# Patient Record
Sex: FEMALE | Race: WHITE | NOT HISPANIC OR LATINO
[De-identification: names, ages, dates, MRNs, and addresses within clinical notes are randomized per-mention and may not be internally consistent; named-entity substitution may affect disease eponyms.]

---

## 2022-10-20 ENCOUNTER — APPOINTMENT (OUTPATIENT)
Dept: ORTHOPEDIC SURGERY | Facility: CLINIC | Age: 27
End: 2022-10-20

## 2022-10-20 VITALS — BODY MASS INDEX: 25.52 KG/M2 | WEIGHT: 130 LBS | HEIGHT: 60 IN

## 2022-10-20 DIAGNOSIS — Z86.19 PERSONAL HISTORY OF OTHER INFECTIOUS AND PARASITIC DISEASES: ICD-10-CM

## 2022-10-20 DIAGNOSIS — Z78.9 OTHER SPECIFIED HEALTH STATUS: ICD-10-CM

## 2022-10-20 DIAGNOSIS — T14.8XXA OTHER INJURY OF UNSPECIFIED BODY REGION, INITIAL ENCOUNTER: ICD-10-CM

## 2022-10-20 DIAGNOSIS — A69.23 ARTHRITIS DUE TO LYME DISEASE: ICD-10-CM

## 2022-10-20 DIAGNOSIS — M22.2X2 PATELLOFEMORAL DISORDERS, LEFT KNEE: ICD-10-CM

## 2022-10-20 PROBLEM — Z00.00 ENCOUNTER FOR PREVENTIVE HEALTH EXAMINATION: Status: ACTIVE | Noted: 2022-10-20

## 2022-10-20 PROCEDURE — 99204 OFFICE O/P NEW MOD 45 MIN: CPT

## 2022-10-23 PROBLEM — M22.2X2 PATELLOFEMORAL PAIN SYNDROME OF LEFT KNEE: Status: ACTIVE | Noted: 2022-10-20

## 2022-10-23 PROBLEM — A69.23 LYME ARTHRITIS OF KNEE: Status: ACTIVE | Noted: 2022-10-20

## 2022-10-23 PROBLEM — T14.8XXA NERVE DAMAGE: Status: RESOLVED | Noted: 2022-10-20 | Resolved: 2022-10-23

## 2022-10-23 PROBLEM — Z86.19 HISTORY OF LYME DISEASE: Status: RESOLVED | Noted: 2022-10-20 | Resolved: 2022-10-23
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
## 2022-10-23 NOTE — IMAGING
[de-identified] : The patient is a well appearing 26 year  old female of their stated age. \par Patient ambulates with a normal gait. \par Negative straight leg raise bilateral \par \par Effected Knee: LEFT                         	 \par ROM:  0-145 degrees \par Lachman: Negative \par Pivot Shift: Negative \par Anterior Drawer: Negative \par Posterior Drawer / Sag:Negative \par Varus Stress 0 degrees: Stable \par Varus Stress 30 degrees: Stable \par Valgus Stress 0 degrees: Stable \par Valgus Stress 30 degrees: Stable \par Medial Raheem: Negative \par Lateral Raheem: Negative \par Patella Glide: 2+ \par Patella Apprehension: Negative \par Patella Grind: Negative \par \par Palpation: \par Medial Joint Line: TENDER\par Lateral Joint Line: TENDER\par Medial Collateral Ligament: Nontender \par Lateral Collateral Ligament/PLC: Nontender \par Distal Femur: Nontender \par Proximal Tibia: Nontender \par Tibial Tubercle: Nontender \par Distal Pole Patella: Nontender \par Quadriceps Tendon: Nontender &  Intact \par Patella Tendon: TENDER MEDIALLY &  Intact \par Medial Distal Hamstring/PES: Nontender \par Lateral Distal Hamstring: Nontender & Stable \par Iliotibial Band: Nontender \par Medial Patellofemoral Ligament: Nontender \par Adductor: Nontender \par Proximal GSC-Plantaris: Nontender \par Calf: Supple & Nontender \par \par Inspection: \par Deformity: No \par Erythema: No \par Ecchymosis: No \par Abrasions: No \par Effusion: MODERATE \par Prepatella Bursitis: No \par Neurologic Exam: \par Sensation L4-S1: Grossly Intact \par Motor Exam: \par Quadriceps: 5 out of 5 \par Hamstrings: 5 out of 5 \par EHL: 5 out of 5 \par FHL: 5 out of 5 \par TA: 5 out of 5 \par GS: 5 out of 5 \par Circulatory/Pulses: \par Dorsalis Pedis: 2+ \par Posterior Tibialis: 2+ \par Additional Pertinent Findings: None \par \par Contralateral Knee:                           	 \par ROM: 0-145 degrees \par Other Pertinent Findings: None \par \par Assessment: The patient is a 26 year  old female  with left knee pain and radiographic and physical exam findings consistent with lyme arthritis and secondary PF syndrome.   \par The patient’s condition is acute\par Documents/Results Reviewed Today: MRI left knee \par Tests/Studies Independently Interpreted Today:  MRI left knee reveals synovitis and inflammation otherwise benign \par Pertinent findings include:  medial patella tenderness, medial and lateral joint line tenderness, moderate effusion\par Confounding medical conditions/concerns: Lyme disease\par \par Plan: Advised patient to complete Lyme treatment. Patient will start physical therapy, HEP, and stretching. Modify activity as discussed.\par Tests Ordered: \par Prescription Medications Ordered: None\par Braces/DME Ordered: None \par Activity/Work/Sports Status: None \par Additional Instructions: None\par Follow-Up:PRN\par \par The patient's current medication management of their orthopedic diagnosis was reviewed today:\par (1) We discussed a comprehensive treatment plan that included possible pharmaceutical management involving the use of prescription strength medications including but not limited to options such as oral Naprosyn 500mg BID, once daily Meloxicam 15 mg, or 500-650 mg Tylenol versus over the counter oral medications and topical prescription NSAID Pennsaid vs over the counter Voltaren gel.\par (2) There is a moderate risk of morbidity with further treatment, especially from use of prescription strength medications and possible side effects of these medications which include upset stomach with oral medications, skin reactions to topical medications and cardiac/renal issues with long term use.\par (3) I recommended that the patient follow-up with their medical physician to discuss any significant specific potential issues with long term medication use such as interactions with current medications or with exacerbation of underlying medical comorbidities.\par (4) The benefits and risks associated with use of injectable, oral or topical, prescription and over the counter anti-inflammatory medications were discussed with the patient. The patient voiced understanding of the risks including but not limited to bleeding, stroke, kidney dysfunction, heart disease, and were referred to the black box warning label for further information.\par \par I, Glenna Patel, attest that this documentation has been prepared under the direction and in the presence of Provider Dr. Bandar Walton.\par \par The documentation recorded by the scribe accurately reflects the service I personally performed and the decisions made by me.\par

## 2022-10-23 NOTE — DATA REVIEWED
[MRI] : MRI [Left] : left [Knee] : knee [Report was reviewed and noted in the chart] : The report was reviewed and noted in the chart [I independently reviewed and interpreted images and report] : I independently reviewed and interpreted images and report [I reviewed the films/CD and additionally noted] : I reviewed the films/CD and additionally noted [FreeTextEntry1] :  synovitis and inflammation otherwise benign

## 2025-09-17 ENCOUNTER — NON-APPOINTMENT (OUTPATIENT)
Age: 30
End: 2025-09-17